# Patient Record
Sex: MALE | Race: WHITE | NOT HISPANIC OR LATINO | URBAN - METROPOLITAN AREA
[De-identification: names, ages, dates, MRNs, and addresses within clinical notes are randomized per-mention and may not be internally consistent; named-entity substitution may affect disease eponyms.]

---

## 2018-01-02 ENCOUNTER — OFFICE VISIT (OUTPATIENT)
Dept: ORTHOPEDICS | Facility: CLINIC | Age: 23
End: 2018-01-02
Payer: COMMERCIAL

## 2018-01-02 ENCOUNTER — RADIANT APPOINTMENT (OUTPATIENT)
Dept: GENERAL RADIOLOGY | Facility: CLINIC | Age: 23
End: 2018-01-02
Attending: FAMILY MEDICINE
Payer: COMMERCIAL

## 2018-01-02 VITALS
BODY MASS INDEX: 24.21 KG/M2 | HEIGHT: 77 IN | SYSTOLIC BLOOD PRESSURE: 128 MMHG | WEIGHT: 205 LBS | DIASTOLIC BLOOD PRESSURE: 78 MMHG

## 2018-01-02 DIAGNOSIS — M25.541 PAIN IN THUMB JOINT WITH MOVEMENT, RIGHT: Primary | ICD-10-CM

## 2018-01-02 DIAGNOSIS — M25.541 PAIN IN THUMB JOINT WITH MOVEMENT, RIGHT: ICD-10-CM

## 2018-01-02 DIAGNOSIS — M79.644 PAIN OF FINGER OF RIGHT HAND: ICD-10-CM

## 2018-01-02 NOTE — PROGRESS NOTES
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    Reason for visit:     What part of your body is injured / painful?  right thumb    What caused the injury /pain? Insidious onset. Had practicum and was sore that night.    How long ago did your injury occur or pain begin? Late October    What are your most bothersome symptoms? Pain and Swelling    How would you characterize your symptom?  dull    What makes your symptoms better? Ice and Other: OTC splint    What makes your symptoms worse? Other: holding pencil/utensil    Have you been previously seen for this problem? No    Medical History:    Any recent changes to your medical history? No    Any new medication prescribed since last visit? No    Have you had surgery on this body part before? No    Social History:    Occupation: Dental school student    Handedness: Right    Exercise: 3-4 days/week    Review of Systems:    Do you have fever, chills, weight loss? No    Do you have any vision problems? No    Do you have any chest pain or edema? No    Do you have any shortness of breath or wheezing?  No    Do you have stomach problems? No    Do you have any numbness or focal weakness? No    Do you have diabetes? No    Do you have problems with bleeding or clotting? No    Do you have an rashes or other skin lesions? No

## 2018-01-02 NOTE — MR AVS SNAPSHOT
After Visit Summary   1/2/2018    Wyatt Ni    MRN: [id number]           Patient Information     Date Of Birth          1995        Visit Information        Provider Department      1/2/2018 1:20 PM Crow Hale DO [facility]        Today's Diagnoses     Pain in thumb joint with movement, right    -  1    Pain of finger of right hand           Follow-ups after your visit        Additional Services     OCCUPATIONAL THERAPY REFERRAL       [facility] provides Occupational Therapy evaluation and treatment and many specialty services across the [facility].  If requesting a specialty program, please choose from the list below.    If you have not heard from the scheduling office within 2 business days, please call [phone] for all locations, with the exception of [place], please call [phone].     Treatment: Evaluation & Treatment    Please be aware that coverage of these services is subject to the terms and limitations of your health insurance plan.  Call member services at your health plan with any benefit or coverage questions.                  Your next 10 appointments already scheduled     Jan 12, 2018  7:00 AM CST   SONIA Hand with Farnaz Dos Santos OT   [facility] ([facility])    [address] [place] [address] [phone]              Who to contact     Please call your clinic at [phone] to:    Ask questions about your health    Make or cancel appointments    Discuss your medicines    Learn about your test results    Speak to your doctor   If you have compliments or concerns about an experience at your clinic, or if you wish to file a complaint, please contact [facility] Patient Relations at [phone] or email us at [email or web address]         Additional Information About Your Visit        MyChart Information  "    [email or web address] gives you secure access to your electronic health record. If you see a primary care provider, you can also send messages to your care team and make appointments. If you have questions, please call your primary care clinic.  If you do not have a primary care provider, please call [phone] and they will assist you.      [email or web address] is an electronic gateway that provides easy, online access to your medical records. With [email or web address], you can request a clinic appointment, read your test results, renew a prescription or communicate with your care team.     To access your existing account, please contact your [facility] or call [phone] for assistance.        Care EveryWhere ID     This is your Care EveryWhere ID. This could be used by other organizations to access your [facility] medical records  [id number]        Your Vitals Were     Height BMI (Body Mass Index)                1.956 m (6' 5\") 24.31 kg/m2           Blood Pressure from Last 3 Encounters:   01/02/18 128/78   05/13/16 132/70   05/08/15 126/70    Weight from Last 3 Encounters:   01/02/18 93 kg (205 lb)   05/13/16 98.5 kg (217 lb 1.6 oz)   05/08/15 92.5 kg (204 lb)              We Performed the Following     OCCUPATIONAL THERAPY REFERRAL        Primary Care Provider Office Phone # Fax #    Brenda BRYNN Mayes [phone]       [address] [facility] [address]        Equal Access to Services     [facility]: Hadii mateo hallo Soosmin, waaxda luqadaha, qaybta kaalmada jesseyavarun, heaven hernández. So [facility] [phone].    ATENCIÓN: Si habla español, tiene a jones disposición servicios gratuitos de asistencia lingüística. Llame al [phone].    We comply with applicable federal civil rights laws and [place] laws. We do not discriminate on the basis of race, color, national origin, age, disability, sex, sexual orientation, or gender identity.            Thank " you!     Thank you for choosing [facility]  for your care. Our goal is always to provide you with excellent care. Hearing back from our patients is one way we can continue to improve our services. Please take a few minutes to complete the written survey that you may receive in the mail after your visit with us. Thank you!             Your Updated Medication List - Protect others around you: Learn how to safely use, store and throw away your medicines at www.disposemymeds.org.          This list is accurate as of: 1/2/18 11:59 PM.  Always use your most recent med list.                   Brand Name Dispense Instructions for use Diagnosis    CLARITIN 10 MG capsule   Generic drug:  loratadine      Take 10 mg by mouth daily.        fluticasone 50 MCG/ACT spray    FLONASE    1 Package    2 sprays by Both Nostrils route daily.    Chronic rhinitis       ibuprofen 200 MG tablet    ADVIL/MOTRIN    100    TAKE 1 TO 2 TABLETS EVERY 4 TO 6 HOURS AS NEEDED WITH FOOD

## 2018-01-02 NOTE — LETTER
1/2/2018       RE: Wyatt Ni  [address] [facility] [address]     Dear Colleague,    Thank you for referring your patient, Wyatt Ni, to the [facility] SPORTS AND ORTHOPAEDIC WALK IN CLINIC at [facility]. Please see a copy of my visit note below.          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    Reason for visit:     What part of your body is injured / painful?  right thumb    What caused the injury /pain? Insidious onset. Had practicum and was sore that night.    How long ago did your injury occur or pain begin? Late October    What are your most bothersome symptoms? Pain and Swelling    How would you characterize your symptom?  dull    What makes your symptoms better? Ice and Other: OTC splint    What makes your symptoms worse? Other: holding pencil/utensil    Have you been previously seen for this problem? No    Medical History:    Any recent changes to your medical history? No    Any new medication prescribed since last visit? No    Have you had surgery on this body part before? No    Social History:    Occupation: Dental school student    Handedness: Right    Exercise: 3-4 days/week    Review of Systems:    Do you have fever, chills, weight loss? No    Do you have any vision problems? No    Do you have any chest pain or edema? No    Do you have any shortness of breath or wheezing?  No    Do you have stomach problems? No    Do you have any numbness or focal weakness? No    Do you have diabetes? No    Do you have problems with bleeding or clotting? No    Do you have an rashes or other skin lesions? No           CHIEF COMPLAINT:  New Patient (Right thumb pain)       HISTORY OF PRESENT ILLNESS  Mr. Ni is a pleasant 22 year old year old male who presents to clinic today with pain of his right dominant thumb since October.  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  Noticed pain at the MCP joint of his right thumb.  Additional small area of swelling over extensor tendon of thumb.  Also noted pain at second digit which seems to be resolved.  This pain typically occurs with gripping, utensil use and can be sporadic at times.  Pain is described as a aching.  Relieved with rest.  He has tried using a thumb splint at times, icing at times but notes pain is still present and noticed during his practicum (fine motor use of hands) last week.      Additional history: as documented    MEDICAL HISTORY  Patient Active Problem List   Diagnosis     Tooth avulsion     Minor head injury     Allergic rhinitis     Pain of right thumb       Current Outpatient Prescriptions   Medication Sig Dispense Refill     Loratadine (CLARITIN) 10 MG capsule Take 10 mg by mouth daily.       fluticasone (FLONASE) 50 MCG/ACT nasal spray 2 sprays by Both Nostrils route daily. 1 Package 12     IBUPROFEN 200 MG OR TABS TAKE 1 TO 2 TABLETS EVERY 4 TO 6 HOURS AS NEEDED WITH FOOD 100 0       Allergies   Allergen Reactions     No Known Drug Allergies        Family History   Problem Relation Age of Onset     CANCER Paternal Grandfather      prostate     HEART DISEASE Paternal Grandfather      bi-pass     Lipids Mother      Lipids Father      Lipids Maternal Grandmother      Lipids Maternal Grandfather      Lipids Paternal Grandfather      Social:   Dental student  Right handed    Additional medical/Social/Surgical histories reviewed in [facility] and updated as appropriate.     REVIEW OF SYSTEMS (1/5/2018)  CONSTITUTIONAL: Denies fever and weight loss  EYES: Denies acute vision changes  ENT: Denies hearing changes or difficulty swallowing  CARDIAC: Denies chest pain or edema  RESPIRATORY: Denies dyspnea, cough or wheeze  GASTROINTESTINAL: Denies abdominal pain, nausea, vomiting  MUSCULOSKELETAL: See HPI  SKIN: Denies any recent  "rash or lesion  NEUROLOGICAL: Denies numbness or focal weakness  PSYCHIATRIC: No history of psychiatric symptoms or problems  ENDOCRINE: Denies current diagnosis of diabetes  HEMATOLOGY: Denies episodes of easy bleeding      PHYSICAL EXAM  /78  Ht 1.956 m (6' 5\")  Wt 93 kg (205 lb)  BMI 24.31 kg/m2    General Appearance: Well appearing, alert, in no acute distress, well-hydrated, and well nourished  Cardiovascular: no signs of upper or lower extremity edema  Respiratory: no respiratory distress, no audible wheezing, no labored breathing, symmetric thoracic excursion  Psychiatric: mood and affect are appropriate, patient is oriented to time, place and person  Musculoskeletal: Right hand  Inspection: Normal inspection of hand with no swelling, ecchymosis or obvious deformity.  Skin is intact.  Palpation: Tenderness to palpation of right CMC joint dorsally. Additional tenderness over extensor surface of index finger. Nontender throughout the remainder of hand and fingers. No tenderness to palpation overlying anatomical snuffbox.    Range of motion: Good overall range of motion of thumb in all planes.  Fingers with normal flexion and extension.  FDP and FDS intact. EDC and EIP intact.   No rotational malalignment or scissoring of digits.  Strength: Full strength of flexion and extension of digits.   strength normal compared bilaterally.   Special Tests:   -Finkelstein Test: Negative  -Tinel's sign: Negative  -Median nerve compression test: Negative  -Fromet's sign: negative  -Hand intrinsics: Intact  Neurologic: Sensation intact throughout hand and fingers  Vascular: Capillary refill <2s.  Digits warm, well perfused.       IMAGING : XR RT hand. Final results and radiologist's interpretation, available in the [facility] health record. Images were reviewed with the patient/family members in the office today. My personal interpretation of the performed imaging is no acute osseous abnormality, CMC joint space " preserved.     ASSESSMENT & PLAN  Mr. Ni is a 22 year old year old male who presents to clinic today with intermittent pain of his thumb and second digit x 2 months.  Pain is more pronounced with use of his hands.  His symptoms may be related to overuse considering profession vs. Mild ulnar nerve entrapment.  It is quite possible that both conditions coexist given focal tenderness at CMC.  In any case, he will benefit from evaluation and treatment with occupational hand therapy    Diagnosis:   Pain of thumb  Pain of index finger    -Referral to OT  -Continue splinting  -Ibuprofen, ice  -Follow up 4 weeks / after OT; if persisting will consider EMG vs. MR    It was a pleasure seeing Wyatt today.    Crow Hale DO, [facility]  Primary Care Sports Medicine      Again, thank you for allowing me to participate in the care of your patient.      Sincerely,    Crow Hale DO

## 2018-01-04 ENCOUNTER — THERAPY VISIT (OUTPATIENT)
Dept: OCCUPATIONAL THERAPY | Facility: CLINIC | Age: 23
End: 2018-01-04
Payer: COMMERCIAL

## 2018-01-04 DIAGNOSIS — M79.644 PAIN OF RIGHT THUMB: Primary | ICD-10-CM

## 2018-01-04 PROCEDURE — 97165 OT EVAL LOW COMPLEX 30 MIN: CPT | Mod: GO | Performed by: OCCUPATIONAL THERAPIST

## 2018-01-04 PROCEDURE — 97140 MANUAL THERAPY 1/> REGIONS: CPT | Mod: GO | Performed by: OCCUPATIONAL THERAPIST

## 2018-01-04 PROCEDURE — 97110 THERAPEUTIC EXERCISES: CPT | Mod: GO | Performed by: OCCUPATIONAL THERAPIST

## 2018-01-04 NOTE — MR AVS SNAPSHOT
After Visit Summary   1/4/2018    Wyatt Ni    MRN: [id number]           Patient Information     Date Of Birth          1995        Visit Information        Provider Department      1/4/2018 11:30 AM Farnaz Dos Santos OT [facility]        Today's Diagnoses     Pain of right thumb    -  1       Follow-ups after your visit        Your next 10 appointments already scheduled     Jan 12, 2018  7:00 AM CST   [facility] Hand with JESSICA [facility] ([facility])    [address] [place] [address]  [address] [facility] [address] [phone]              Who to contact     If you have questions or need follow up information about today's clinic visit or your schedule please contact [facility] directly at [phone].  Normal or non-critical lab and imaging results will be communicated to you by [employer], letter or phone within 4 business days after the clinic has received the results. If you do not hear from us within 7 days, please contact the clinic through [employer] or phone. If you have a critical or abnormal lab result, we will notify you by phone as soon as possible.  Submit refill requests through [employer] or call your pharmacy and they will forward the refill request to us. Please allow 3 business days for your refill to be completed.          Additional Information About Your Visit        MyChart Information     [employer] gives you secure access to your electronic health record. If you see a primary care provider, you can also send messages to your care team and make appointments. If you have questions, please call your primary care clinic.  If you do not have a primary care provider, please call [phone] and they will assist you.        Care EveryWhere ID     This is your Care EveryWhere ID. This could be used by other organizations to access your [place] medical records  [id number]         Blood Pressure from Last 3  Encounters:   01/02/18 128/78   05/13/16 132/70   05/08/15 126/70    Weight from Last 3 Encounters:   01/02/18 93 kg (205 lb)   05/13/16 98.5 kg (217 lb 1.6 oz)   05/08/15 92.5 kg (204 lb)              We Performed the Following     HC OT EVAL, LOW COMPLEXITY     MANUAL THER TECH,1+REGIONS,EA 15 MIN     THERAPEUTIC EXERCISES        Primary Care Provider Office Phone # Fax Daisha SWAIN MD [place] [phone]       [address] [facility] [address]        Equal Access to Services     [facility]: Hadii aad ku hadasho Soosmin, waaxda luqadaha, qaybta kaalmada adeduglasyada, heaven carpenter . So [facility] [phone].    ATENCIÓN: Si habla español, tiene a jones disposición servicios gratuitos de asistencia lingüística. Llame al [phone].    We comply with applicable federal civil rights laws and [place] laws. We do not discriminate on the basis of race, color, national origin, age, disability, sex, sexual orientation, or gender identity.            Thank you!     Thank you for choosing [facility]ER[facility]  for your care. Our goal is always to provide you with excellent care. Hearing back from our patients is one way we can continue to improve our services. Please take a few minutes to complete the written survey that you may receive in the mail after your visit with us. Thank you!             Your Updated Medication List - Protect others around you: Learn how to safely use, store and throw away your medicines at www.disposemymeds.org.          This list is accurate as of: 1/4/18 12:17 PM.  Always use your most recent med list.                   Brand Name Dispense Instructions for use Diagnosis    CLARITIN 10 MG capsule   Generic drug:  loratadine      Take 10 mg by mouth daily.        fluticasone 50 MCG/ACT spray    FLONASE    1 Package    2 sprays by Both Nostrils route daily.    Chronic rhinitis       ibuprofen 200 MG tablet    ADVIL/MOTRIN    100    TAKE 1 TO  2 TABLETS EVERY 4 TO 6 HOURS AS NEEDED WITH FOOD

## 2018-01-04 NOTE — PROGRESS NOTES
Hand Therapy Initial Evaluation    Current Date:  1/4/2018    Diagnosis: R thumb and index finger pain  DOI:  November 2017   Procedure:  none    Precautions: NA    Subjective:  Wyatt Ni is a 22 year old right hand dominant male.    Patient reports symptoms of pain, stiffness/loss of motion, edema, numbness and tingling  of the right thumb, index finger and small finger which occurred due to not sure. Since onset symptoms are Unchanged  Special tests:  x-ray.  Previous treatment: OTC splint, ice, anti-inflammatories.    General health as reported by patient is excellent.  Pertinent medical history includes:numbness/tingling  Medical allergies:none.  Surgical history: none.  Medication history: anti-inflammatory.    Occupational Profile Information:  Current occupation is  Student-dentistry  Currently working in normal job without restrictions  Job Tasks: repetitive tasks  Prior functional level:  no limitations  Barriers include:none  Mobility: No difficulty  Transportation: drives  Leisure activities/hobbies: basketball    Functional Outcome Measure:   Upper Extremity Functional Index Score:  SCORE:   Column Totals: /80: 74   (A lower score indicates greater disability.)    Objective:  Pain Level Report  VAS(0-10) 1/4/2018   At Rest: 0/10   At worst: 4/10     Report of Pain:  Location:  CMC joint of thumb, radial side of index metacarpal  Pain Quality:  Sharp  Frequency: intermittent    Pain is worst:  daytime  Exacerbated by:  Repetitive activities  Relieved by:  otc medications and rest  Progression:  improving  Edema:  MILD  Sensation: Decreased Ulnar Nerve distribution    Numbness/tingling Level Report  VAS(0-10) 1/4/2018   At Rest: 2/10   At Worst: 7/10     Report of Numbness/Tingling:  Location:  small finger    ROM  Pain Report:  - none    + mild    ++ moderate    +++ severe   Thumb    1/4/2018   AROM  (PROM) left right   MP -11/72 -13/69   IP 0/76 0/62   RAB 51 47   PABD 56 44     Resistance 1/4/2018    APL -   FPL -   FPB -   EPL -   EPB -   opponens -     Provocative Testing 1/4/2018   Tinel's at [address] -   Tinel's at [address] [place]      Strength:   (Measured in pounds)  Pain Report:  - none    + mild    ++ moderate    +++ severe     1/4/2018   Trials left right   1  2  3 66  71  78 96  90  75   Average: 72 87     Lat Pinch  1/4/2018   Trials left right   1  2  3 18  20  19 19  21  21   Average: 19 20     3 Pt Pinch  1/4/2018   Trials left right   1  2  3 18  20  22 21  21  22   Average: 20 21     Assessment:  Patient presents with symptoms consistent with diagnosis of R thumb, index finger and small finger pain,  with conservative intervention.     Patient's limitations or Problem List includes:  Pain, Decreased ROM/motion, Weakness and Sensory disturbance of the left thumb, index finger and small finger which interferes with the patient's ability to perform Self Care Tasks (dressing, eating, bathing, hygiene/toileting) and Work Tasks as compared to previous level of function.    Rehab Potential:  Excellent - Return to full activity, no limitations    Patient will benefit from skilled Occupational Therapy to increase ROM, overall strength and stability of thumb and decrease pain to return to previous activity level and resume normal daily tasks and to reach their rehab potential.    Barriers to Learning:  No barrier    Communication Issues:  Patient appears to be able to clearly communicate and understand verbal and written communication and follow directions correctly.    Chart Review: Simple history review with patient    Identified Performance Deficits: dressing, hygiene and grooming, meal preparation and cleanup and work    Assessment of Occupational Performance:  3-5 Performance Deficits    Clinical Decision Making (Complexity): Low complexity    Treatment Explanation:  The following has been discussed with the patient:  RX ordered/plan of care  Anticipated outcomes  Possible risks and side  effects    Plan:  Frequency:  1 X week, once daily  Duration:  for 4 weeks    Treatment Plan:   Modalities:  US, Fluidotherapy and Paraffin  Therapeutic Exercise:  AROM, AAROM, PROM, Tendon Gliding, Blocking, Isotonics, Isometrics and Stabilization  Manual Techniques:  Myofascial release  Orthotic Fabrication:  prn  Self Care:  Self Care Tasks  Discharge Plan:  Achieve all LTG.  Independent in home treatment program.  Reach maximal therapeutic benefit.    Home Exercise Program:  Thumb stability   C   1st DI strengthening  Massage   Chip clip adductor   Golfball/marble to thenar eminence  Ice vs. Heat  Thumb AROM  Ulnar Nerve glide    Next Visit:  Nerve gliding  A/AA/PROM  Thumb stabilty

## 2018-01-05 NOTE — PROGRESS NOTES
CHIEF COMPLAINT:  New Patient (Right thumb pain)       HISTORY OF PRESENT ILLNESS  Mr. Ni is a pleasant 22 year old year old male who presents to clinic today with pain of his right dominant thumb since October.  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  Noticed pain at the MCP joint of his right thumb.  Additional small area of swelling over extensor tendon of thumb.  Also noted pain at second digit which seems to be resolved.  This pain typically occurs with gripping, utensil use and can be sporadic at times.  Pain is described as a aching.  Relieved with rest.  He has tried using a thumb splint at times, icing at times but notes pain is still present and noticed during his practicum (fine motor use of hands) last week.      Additional history: as documented    MEDICAL HISTORY  Patient Active Problem List   Diagnosis     Tooth avulsion     Minor head injury     Allergic rhinitis     Pain of right thumb       Current Outpatient Prescriptions   Medication Sig Dispense Refill     Loratadine (CLARITIN) 10 MG capsule Take 10 mg by mouth daily.       fluticasone (FLONASE) 50 MCG/ACT nasal spray 2 sprays by Both Nostrils route daily. 1 Package 12     IBUPROFEN 200 MG OR TABS TAKE 1 TO 2 TABLETS EVERY 4 TO 6 HOURS AS NEEDED WITH FOOD 100 0       Allergies   Allergen Reactions     No Known Drug Allergies        Family History   Problem Relation Age of Onset     CANCER Paternal Grandfather      prostate     HEART DISEASE Paternal Grandfather      bi-pass     Lipids Mother      Lipids Father      Lipids Maternal Grandmother      Lipids Maternal Grandfather      Lipids Paternal Grandfather      Social:   Dental student  Right handed    Additional medical/Social/Surgical histories reviewed in [facility] and updated as appropriate.     REVIEW OF SYSTEMS (1/5/2018)  CONSTITUTIONAL: Denies fever and weight loss  EYES: Denies acute vision  "changes  ENT: Denies hearing changes or difficulty swallowing  CARDIAC: Denies chest pain or edema  RESPIRATORY: Denies dyspnea, cough or wheeze  GASTROINTESTINAL: Denies abdominal pain, nausea, vomiting  MUSCULOSKELETAL: See HPI  SKIN: Denies any recent rash or lesion  NEUROLOGICAL: Denies numbness or focal weakness  PSYCHIATRIC: No history of psychiatric symptoms or problems  ENDOCRINE: Denies current diagnosis of diabetes  HEMATOLOGY: Denies episodes of easy bleeding      PHYSICAL EXAM  /78  Ht 1.956 m (6' 5\")  Wt 93 kg (205 lb)  BMI 24.31 kg/m2    General Appearance: Well appearing, alert, in no acute distress, well-hydrated, and well nourished  Cardiovascular: no signs of upper or lower extremity edema  Respiratory: no respiratory distress, no audible wheezing, no labored breathing, symmetric thoracic excursion  Psychiatric: mood and affect are appropriate, patient is oriented to time, place and person  Musculoskeletal: Right hand  Inspection: Normal inspection of hand with no swelling, ecchymosis or obvious deformity.  Skin is intact.  Palpation: Tenderness to palpation of right CMC joint dorsally. Additional tenderness over extensor surface of index finger. Nontender throughout the remainder of hand and fingers. No tenderness to palpation overlying anatomical snuffbox.    Range of motion: Good overall range of motion of thumb in all planes.  Fingers with normal flexion and extension.  FDP and FDS intact. EDC and EIP intact.   No rotational malalignment or scissoring of digits.  Strength: Full strength of flexion and extension of digits.   strength normal compared bilaterally.   Special Tests:   -Finkelstein Test: Negative  -Tinel's sign: Negative  -Median nerve compression test: Negative  -Fromet's sign: negative  -Hand intrinsics: Intact  Neurologic: Sensation intact throughout hand and fingers  Vascular: Capillary refill <2s.  Digits warm, well perfused.       IMAGING : XR RT hand. Final " results and radiologist's interpretation, available in the [facility] health record. Images were reviewed with the patient/family members in the office today. My personal interpretation of the performed imaging is no acute osseous abnormality, CMC joint space preserved.     ASSESSMENT & PLAN  Mr. Ni is a 22 year old year old male who presents to clinic today with intermittent pain of his thumb and second digit x 2 months.  Pain is more pronounced with use of his hands.  His symptoms may be related to overuse considering profession vs. Mild ulnar nerve entrapment.  It is quite possible that both conditions coexist given focal tenderness at CMC.  In any case, he will benefit from evaluation and treatment with occupational hand therapy    Diagnosis:   Pain of thumb  Pain of index finger    -Referral to OT  -Continue splinting  -Ibuprofen, ice  -Follow up 4 weeks / after OT; if persisting will consider EMG vs. MR    It was a pleasure seeing Wyatt today.    Crow Hale DO, CAM  Primary Care Sports Medicine

## 2018-01-12 ENCOUNTER — THERAPY VISIT (OUTPATIENT)
Dept: OCCUPATIONAL THERAPY | Facility: CLINIC | Age: 23
End: 2018-01-12
Payer: COMMERCIAL

## 2018-01-12 DIAGNOSIS — M79.644 PAIN OF RIGHT THUMB: ICD-10-CM

## 2018-01-12 PROCEDURE — 97110 THERAPEUTIC EXERCISES: CPT | Mod: GO | Performed by: OCCUPATIONAL THERAPIST

## 2018-01-12 PROCEDURE — 97140 MANUAL THERAPY 1/> REGIONS: CPT | Mod: GO | Performed by: OCCUPATIONAL THERAPIST

## 2018-01-30 ENCOUNTER — THERAPY VISIT (OUTPATIENT)
Dept: OCCUPATIONAL THERAPY | Facility: CLINIC | Age: 23
End: 2018-01-30
Payer: COMMERCIAL

## 2018-01-30 DIAGNOSIS — M79.644 PAIN OF RIGHT THUMB: ICD-10-CM

## 2018-01-30 PROCEDURE — 97140 MANUAL THERAPY 1/> REGIONS: CPT | Mod: GO | Performed by: OCCUPATIONAL THERAPIST

## 2018-01-30 PROCEDURE — 97110 THERAPEUTIC EXERCISES: CPT | Mod: GO | Performed by: OCCUPATIONAL THERAPIST

## 2018-02-20 ENCOUNTER — THERAPY VISIT (OUTPATIENT)
Dept: OCCUPATIONAL THERAPY | Facility: CLINIC | Age: 23
End: 2018-02-20
Payer: COMMERCIAL

## 2018-02-20 DIAGNOSIS — M79.644 PAIN OF RIGHT THUMB: ICD-10-CM

## 2018-02-20 PROCEDURE — 97110 THERAPEUTIC EXERCISES: CPT | Mod: GO | Performed by: OCCUPATIONAL THERAPIST

## 2018-02-20 PROCEDURE — 97140 MANUAL THERAPY 1/> REGIONS: CPT | Mod: GO | Performed by: OCCUPATIONAL THERAPIST

## 2018-02-20 NOTE — PROGRESS NOTES
"Hand Therapy Discharge Note    Current Date:  2/20/2018    Diagnosis: R thumb and index finger pain  DOI:  November 2017   Procedure:  none    Precautions: NA    Reporting period is 1/4/18 to 2/20/2018    Subjective:   Subjective changes noted by patient:  \"I feel like my hand is better!\"  Functional changes noted by patient:  Improvement in Self Care Tasks (dressing, eating, bathing, hygiene/toileting), Work Tasks, Sleep Patterns, Recreational Activities, Household Chores and Driving   Patient has noted adverse reaction to:  None    Functional Outcome Measure:  Upper Extremity Functional Index Score:  SCORE:   Column Totals: /80: 77   (A lower score indicates greater disability.)    Objective:  Pain Level Report  VAS(0-10) 1/4/2018 2/20/18   At Rest: 0/10 0/10   At worst: 4/10 3/10     Report of Pain:  Location:  CMC joint of thumb, radial side of index metacarpal  Pain Quality:  dull  Frequency: intermittent    Pain is worst:  daytime  Exacerbated by:  Repetitive activities  Relieved by:  rest  Progression:  improving  Edema:  MILD  Sensation: Decreased Ulnar Nerve distribution    Numbness/tingling Level Report  VAS(0-10) 1/4/2018 2/20/18   At Rest: 2/10 0/10   At Worst: 7/10 0/10     Report of Numbness/Tingling:  Location:  small finger    ROM  Pain Report:  - none    + mild    ++ moderate    +++ severe   Thumb    1/4/2018 2/20/18   AROM  (PROM) left right    MP -11/72 -13/69 -13/77   IP 0/76 0/62 0/64   RAB 51 47 58   PABD 56 44 60     Resistance 1/4/2018   APL -   FPL -   FPB -   EPL -   EPB -   opponens -     Provocative Testing 1/4/2018   Tinel's at [place] -   Tinel's at [place]      Strength:   (Measured in pounds)  Pain Report:  - none    + mild    ++ moderate    +++ severe     1/4/2018 2/20/18   Trials left right    1  2  3 66  71  78 96  90  75 98  99  104   Average: 72 87 100     Lat Pinch  1/4/2018 2/20/18   Trials left right    1  2  3 18  20  19 19  21  21 16  18  17   Average: 19 20 " 17     3 Pt Pinch  1/4/2018 2/20/18   Trials left right    1  2  3 18  20  22 21  21  22 10  12  10   Average: 20 21 11     Please refer to the daily flowsheet for treatment provided today.     Assessment:  Response to therapy has been improvement to:  ROM of Thumb:  All Planes  Fingers: All Planes  Strength:    Edema:  less brawny edema  Pain:  frequency is less    Overall Assessment:  Patient's symptoms are resolving.  STG/LTG:  STGoals have been reviewed and progress or achievement has occurred;  see goal sheet for details and updates.    Plan:  Frequency/Duration:  DC to I HEP.  Appropriateness of Rx I have re-evaluated this patient and find that the nature, scope, duration and intensity of the therapy is appropriate for the medical condition of the patient.  Recommendations for Continued Therapy  DC to I HEP.    Home Exercise Program:  Thumb stability   C   1st DI strengthening  Massage   Chip clip adductor   Golfball/marble to thenar eminence  Ice vs. Heat  Thumb AROM  Ulnar Nerve glide

## 2018-02-20 NOTE — MR AVS SNAPSHOT
After Visit Summary   2/20/2018    Wyatt Ni    MRN: [id number]           Patient Information     Date Of Birth          1995        Visit Information        Provider Department      2/20/2018 5:30 PM Farnaz Dos Santos, OT [facility]        Today's Diagnoses     Pain of right thumb           Follow-ups after your visit        Who to contact     If you have questions or need follow up information about today's clinic visit or your schedule please contact [facility] directly at [phone].  Normal or non-critical lab and imaging results will be communicated to you by MyChart, letter or phone within 4 business days after the clinic has received the results. If you do not hear from us within 7 days, please contact the clinic through COPsynchart or phone. If you have a critical or abnormal lab result, we will notify you by phone as soon as possible.  Submit refill requests through RollSale or call your pharmacy and they will forward the refill request to us. Please allow 3 business days for your refill to be completed.          Additional Information About Your Visit        MyChart Information     RollSale gives you secure access to your electronic health record. If you see a primary care provider, you can also send messages to your care team and make appointments. If you have questions, please call your primary care clinic.  If you do not have a primary care provider, please call [phone] and they will assist you.        Care EveryWhere ID     This is your Care EveryWhere ID. This could be used by other organizations to access your [place] medical records  [id number]         Blood Pressure from Last 3 Encounters:   01/02/18 128/78   05/13/16 132/70   05/08/15 126/70    Weight from Last 3 Encounters:   01/02/18 93 kg (205 lb)   05/13/16 98.5 kg (217 lb 1.6 oz)   05/08/15 92.5 kg (204 lb)              We Performed the Following     MANUAL THER TECH,1+REGIONS,EA 15 MIN      THERAPEUTIC EXERCISES        Primary Care Provider Office Phone # Fax #    Krishnakumari BRYNN MD Sugey [phone]       [address]  [facility] [address]        Equal Access to Services     KATHY DAVIS : Komal mateo wilson rafaelo Soyassineali, waaxda luqadaha, qaybta kaalmada adeegyada, heaven velasco laNimaelisha hernández. So [facility] [phone].    ATENCIÓN: Si habla español, tiene a jones disposición servicios gratuitos de asistencia lingüística. Llame al [phone].    We comply with applicable federal civil rights laws and [place] laws. We do not discriminate on the basis of race, color, national origin, age, disability, sex, sexual orientation, or gender identity.            Thank you!     Thank you for choosing [facility] HAND [facility]ER[facility]  for your care. Our goal is always to provide you with excellent care. Hearing back from our patients is one way we can continue to improve our services. Please take a few minutes to complete the written survey that you may receive in the mail after your visit with us. Thank you!             Your Updated Medication List - Protect others around you: Learn how to safely use, store and throw away your medicines at www.disposemymeds.org.          This list is accurate as of 2/20/18  6:58 PM.  Always use your most recent med list.                   Brand Name Dispense Instructions for use Diagnosis    CLARITIN 10 MG capsule   Generic drug:  loratadine      Take 10 mg by mouth daily.        fluticasone 50 MCG/ACT spray    FLONASE    1 Package    2 sprays by Both Nostrils route daily.    Chronic rhinitis       ibuprofen 200 MG tablet    ADVIL/MOTRIN    100    TAKE 1 TO 2 TABLETS EVERY 4 TO 6 HOURS AS NEEDED WITH FOOD

## 2018-02-26 PROBLEM — M79.644 PAIN OF RIGHT THUMB: Status: RESOLVED | Noted: 2018-01-04 | Resolved: 2018-02-26

## 2018-10-02 ENCOUNTER — THERAPY VISIT (OUTPATIENT)
Dept: OCCUPATIONAL THERAPY | Facility: CLINIC | Age: 23
End: 2018-10-02
Payer: COMMERCIAL

## 2018-10-02 DIAGNOSIS — M79.644 PAIN OF RIGHT THUMB: Primary | ICD-10-CM

## 2018-10-02 PROCEDURE — 97110 THERAPEUTIC EXERCISES: CPT | Mod: GO | Performed by: OCCUPATIONAL THERAPIST

## 2018-10-02 PROCEDURE — 97140 MANUAL THERAPY 1/> REGIONS: CPT | Mod: GO | Performed by: OCCUPATIONAL THERAPIST

## 2018-10-02 PROCEDURE — 97760 ORTHOTIC MGMT&TRAING 1ST ENC: CPT | Mod: GO | Performed by: OCCUPATIONAL THERAPIST

## 2018-10-02 NOTE — PROGRESS NOTES
"Hand Therapy Progress Note    Current Date:  10/2/2018    Diagnosis: R thumb and index finger pain  DOI:  November 2017   Procedure:  none    Precautions: NA    Reporting period is 2/20/18 to 10/2/2018    Subjective:   Subjective changes noted by patient:  \"The rest of the spring semester went well, but then in the summer, we started seeing patients and it got worse then.  It got more painful around my thumb.  At the end of August we had a 3 week break and I rested it and did my exercises.  I had a board test then and it was 8-3, it got pretty sore after that.\"  Functional changes noted by patient:  No Change to Self Care Tasks (dressing, eating, bathing, hygiene/toileting), Work Tasks, Sleep Patterns, Recreational Activities, Household Chores and Driving   Patient has noted adverse reaction to:  None    Functional Outcome Measure:  Upper Extremity Functional Index Score:  SCORE:   Column Totals: /80: 70   (A lower score indicates greater disability.)    Objective:  Pain Level Report  VAS(0-10) 1/4/2018 2/20/18 10/2/18   At Rest: 0/10 0/10 0/10   At worst: 4/10 3/10 7/10     Report of Pain:  Location:  CMC joint of thumb, 1st DC  Pain Quality:  Dull, tingling, sharp  Frequency: intermittent    Pain is worst:  daytime  Exacerbated by:  Repetitive activities  Relieved by:  rest  Progression:  Worse over last few months  Edema:  MILD  Sensation:   Report of Numbness/Tingling:  Location:   small finger- 1x/since last visit    Sometimes in snuff box/1st DC    ROM  Pain Report:  - none    + mild    ++ moderate    +++ severe   Thumb    1/4/2018 2/20/18 10/2/1/   AROM  (PROM) left right     MP -11/72 -13/69 -13/77 0/74   IP 0/76 0/62 0/64 0/72   RAB 51 47 58 60   PABD 56 44 60 61     Resistance 1/4/2018 10/2/18   APL -    FPL -    FPB -    EPL -    EPB -    opponens - 2/10     Strength:   (Measured in pounds)  Pain Report:  - none    + mild    ++ moderate    +++ severe     1/4/2018 2/20/18 10/2/18   Trials left right   "   1  2  3 66  71  78 96  90  75 98  99  104 91+  91+  83+   Average: 72 87 100 88     Lat Pinch  1/4/2018 2/20/18 10/2/18   Trials left right     1  2  3 18  20  19 19  21  21 16  18  17 19  21  22   Average: 19 20 17 21     3 Pt Pinch  1/4/2018 2/20/18 10/2/18   Trials left right     1  2  3 18  20  22 21  21  22 10  12  10 14  10  12   Average: 20 21 11 12     Please refer to the daily flowsheet for treatment provided today.     Assessment:  Response to therapy has been improvement to:  ROM of Thumb:  All Planes  Strength:   and pinch    Response to therapy has been lack of progress in:  Pain:  intensity of pain has increased    Overall Assessment:  Patient would benefit from continued therapy to achieve rehab potential  STG/LTG:  STGoals have been reviewed and no progress has been made;  see goal sheet for details and changes.    Plan:  Frequency/Duration:  Recommend continuing to see patient  2 X a month, once daily  for 2 months  Appropriateness of Rx I have re-evaluated this patient and find that the nature, scope, duration and intensity of the therapy is appropriate for the medical condition of the patient.  Recommendations for Continued Therapy  Additions to Treatment Plan -  Orthotic Fabrication:  Hand based thumb spica orthosis    Home Exercise Program:  Hand based thumb spica orthosis at night for sleeping  Thumb stability   C   1st DI strengthening  Massage   Chip clip adductor   Golf ball/marble to thenar eminence  Ice vs. Heat  Thumb AROM  Ulnar Nerve glide-PRN    Next Visit:  Orthosis modifications  Thumb stability  strengthening

## 2018-10-02 NOTE — MR AVS SNAPSHOT
After Visit Summary   10/2/2018    Wyatt Ni    MRN: [id number]           Patient Information     Date Of Birth          1995        Visit Information        Provider Department      10/2/2018 4:00 PM Farnaz Dos Santos OT [facility]        Today's Diagnoses     Pain of right thumb    -  1       Follow-ups after your visit        Your next 10 appointments already scheduled     Oct 18, 2018  4:30 PM CDT   [facility] ([facility] [place])    [address] [place] [address]  4th [facility] [address] [phone]              Who to contact     If you have questions or need follow up information about today's clinic visit or your schedule please contact [facility] directly at [phone].  Normal or non-critical lab and imaging results will be communicated to you by Tempolibhart, letter or phone within 4 business days after the clinic has received the results. If you do not hear from us within 7 days, please contact the clinic through Tempolibhart or phone. If you have a critical or abnormal lab result, we will notify you by phone as soon as possible.  Submit refill requests through Zero2IPO or call your pharmacy and they will forward the refill request to us. Please allow 3 business days for your refill to be completed.          Additional Information About Your Visit        MyChart Information     Zero2IPO gives you secure access to your electronic health record. If you see a primary care provider, you can also send messages to your care team and make appointments. If you have questions, please call your primary care clinic.  If you do not have a primary care provider, please call [phone] and they will assist you.        Care EveryWhere ID     This is your Care EveryWhere ID. This could be used by other organizations to access your [place] medical records  [id number]         Blood Pressure from Last 3  Encounters:   01/02/18 128/78   05/13/16 132/70   05/08/15 126/70    Weight from Last 3 Encounters:   01/02/18 93 kg (205 lb)   05/13/16 98.5 kg (217 lb 1.6 oz)   05/08/15 92.5 kg (204 lb)              We Performed the Following     MANUAL THER TECH,1+REGIONS,EA 15 MIN     ORTHOTIC MGMT AND TRAINING, EACH 15 MIN     THERAPEUTIC EXERCISES        Primary Care Provider Office Phone # Fax #    Brenda SWAIN MD S[facility] [phone]       [address] [facility] [address]        Equal Access to Services     [facility]: Hadii aad steve hadasho Soosmin, waaxda luqadaha, qaybta kaalmada brendon, heaven carpenter . So [facility] [phone].    ATENCIÓN: Si habla español, tiene a jones disposición servicios gratuitos de asistencia lingüística. [facility] [phone].    We comply with applicable federal civil rights laws and [place] laws. We do not discriminate on the basis of race, color, national origin, age, disability, sex, sexual orientation, or gender identity.            Thank you!     Thank you for choosing [facility]ER[facility]  for your care. Our goal is always to provide you with excellent care. Hearing back from our patients is one way we can continue to improve our services. Please take a few minutes to complete the written survey that you may receive in the mail after your visit with us. Thank you!             Your Updated Medication List - Protect others around you: Learn how to safely use, store and throw away your medicines at www.disposemymeds.org.          This list is accurate as of 10/2/18 11:59 PM.  Always use your most recent med list.                   Brand Name Dispense Instructions for use Diagnosis    CLARITIN 10 MG capsule   Generic drug:  loratadine      Take 10 mg by mouth daily.        fluticasone 50 MCG/ACT spray    FLONASE    1 Package    2 sprays by Both Nostrils route daily.    Chronic rhinitis       ibuprofen 200 MG tablet    ADVIL/MOTRIN    100     TAKE 1 TO 2 TABLETS EVERY 4 TO 6 HOURS AS NEEDED WITH FOOD

## 2018-10-03 PROBLEM — M79.644 PAIN OF RIGHT THUMB: Status: ACTIVE | Noted: 2018-10-03

## 2019-01-14 ENCOUNTER — TRANSFERRED RECORDS (OUTPATIENT)
Dept: HEALTH INFORMATION MANAGEMENT | Facility: CLINIC | Age: 24
End: 2019-01-14

## 2019-01-14 ENCOUNTER — MEDICAL CORRESPONDENCE (OUTPATIENT)
Dept: HEALTH INFORMATION MANAGEMENT | Facility: CLINIC | Age: 24
End: 2019-01-14

## 2019-02-01 ENCOUNTER — OFFICE VISIT (OUTPATIENT)
Dept: UROLOGY | Facility: CLINIC | Age: 24
End: 2019-02-01
Payer: COMMERCIAL

## 2019-02-01 ENCOUNTER — APPOINTMENT (OUTPATIENT)
Dept: LAB | Facility: CLINIC | Age: 24
End: 2019-02-01
Payer: COMMERCIAL

## 2019-02-01 VITALS
SYSTOLIC BLOOD PRESSURE: 124 MMHG | HEIGHT: 77 IN | DIASTOLIC BLOOD PRESSURE: 72 MMHG | BODY MASS INDEX: 23.95 KG/M2 | HEART RATE: 62 BPM | WEIGHT: 202.8 LBS

## 2019-02-01 DIAGNOSIS — I86.1 LEFT VARICOCELE: Primary | ICD-10-CM

## 2019-02-01 LAB — FSH SERPL-ACNC: 9.5 IU/L (ref 0.7–10.8)

## 2019-02-01 ASSESSMENT — PAIN SCALES - GENERAL: PAINLEVEL: NO PAIN (0)

## 2019-02-01 ASSESSMENT — MIFFLIN-ST. JEOR: SCORE: 2027.27

## 2019-02-01 NOTE — LETTER
"  RE: Wyatt Ni  [address]  [facility] [address]     Dear Colleague,    Thank you for referring your patient, Wyatt Ni, to the [facility] AND [facility]ST FOR PROSTATE AND UROLOGIC CANCERS at [facility]. Please see a copy of my visit note below.    I am seeing Wyatt Ni in consultation for Darvin for evaluation of asymptomatic left varicocele.    HPI:  Wyatt Ni is a 23 year old male with asymptomatic left varicocele.  This was diagnosed several years ago by scrotal ultrasound done 5/8/15, done for a left sided scrotal \"lump\".  On that US the right testis was 21cc, the left was measured at 13cc.  He is not currently trying for fertility.  No voiding issues.  Is not having pain from the varicocele.  Does not report testis asymmetry.     PAST MEDICAL HX:  Past Medical History:   Diagnosis Date     Allergic rhinitis      PAST SURG HX:  Past Surgical History:   Procedure Laterality Date     TONSILLECTOMY       FAMILY HX:  Family History   Problem Relation Age of Onset     Cancer Paternal Grandfather         prostate     Heart Disease Paternal Grandfather         bi-pass     Lipids Mother      Lipids Father      Lipids Maternal Grandmother      Lipids Maternal Grandfather      Lipids Paternal Grandfather        SOCIAL HX:  Social History     Tobacco Use     Smoking status: Never Smoker     Smokeless tobacco: Never Used   Substance Use Topics     Alcohol use: No     Alcohol/week: 0.0 oz     Comment: occ     Drug use: No       MEDICATIONS:  Current Outpatient Medications   Medication Sig     fluticasone (FLONASE) 50 MCG/ACT nasal spray 2 sprays by Both Nostrils route daily. (Patient not taking: Reported on 2/1/2019)     IBUPROFEN 200 MG OR TABS TAKE 1 TO 2 TABLETS EVERY 4 TO 6 HOURS AS NEEDED WITH FOOD     Loratadine (CLARITIN) 10 MG capsule Take 10 mg by mouth daily.     No current facility-administered medications for this visit.  " "      ALLERGIES:  No known drug allergies    GENERAL PHYSICAL EXAM:     /72   Pulse 62   Ht 1.956 m (6' 5\")   Wt 92 kg (202 lb 12.8 oz)   BMI 24.05 kg/m      Constitutional: No acute distress. Well nourished.   PSYCH: normal mood and affect.  NEURO: normal gait, no focal deficits.   EYES: anicteric, EOMI, PERR.  CARDIOPULMONARY: breathing non-labored, pulse regular rate/rhythm, no peripheral edema.  GI: Abdomen soft, non-tender, no surgical scars, no organomegaly.  MUSCULOSKELETAL: normal limb proportions, no muscle wasting, no contractures.  SKIN: Normal virilized hair distribution, no lesions, warts or rashes over genitalia, abdomen extremities or face.  HEME/LYMPH: no ecchymosis, no lymphadenopathy in groi, no lymphedema.     EXAM:  Phallus  circumcised, meatus adequate, no plaques palpated.   Left testis descended , size is normal  , consistency is normal . No intra-testicular masses.   Right testis descended , size is normal  , consistency is normal . No intra-testicular masses.   Epididymes present, non-tender, not-enlarged.   Left cord: Vas present. Grade II  varicocele noted.  Right cord: Vas present. No varicocele noted.     Prostate exam: deferred     Imaging/labs:  Lab Results   Component Value Date    CR 1.08 05/16/2016     No results found for: PSA      ASSESSMENT:     Left varicocele.  Asymptomatic and not trying for fertility at present.  Would be interested in fertility later in life.    PLAN:  Discussed risks, benefits, and alternatives of varix repair, including various methods.    I counseled him extensively on the nature of varicoceles, and their possible effects on testosterone production and fertility.  I described surgery and embolization approaches, and the detailed risks of surgical repair.  These include damage to artery (ischemia), damage to lymphatics ( hydrocele), as well as risk of recurrence (</= 1%). Discussed that about 2/3rds of men see improved semen parameters and that " improvement takes at least 3 months to see. Discussed that most men have decreased pain after varicocele repair but in rare cases varicocele repair can cause testis sensitivity.    Will get baseline fertility testing with labs and semen analysis, and contact him with results.      Discussed that the relative indication to repair his varicocele at this time would be fertility preservation.    He will consider options and I'll contact him with lab results.      Akhil Garces MD     Urological Surgeon

## 2019-02-01 NOTE — PROGRESS NOTES
"I am seeing Wyatt Ni in consultation for Darvin for evaluation of asymptomatic left varicocele.    HPI:  Wyatt Ni is a 23 year old male with asymptomatic left varicocele.  This was diagnosed several years ago by scrotal ultrasound done 5/8/15, done for a left sided scrotal \"lump\".  On that US the right testis was 21cc, the left was measured at 13cc.  He is not currently trying for fertility.  No voiding issues.  Is not having pain from the varicocele.  Does not report testis asymmetry.     REVIEW OF SYSTEMS:  General: negative  Skin: negative  Eyes: negative  Ears/Nose/Throat: negative  Respiratory: negative  Cardiovascular: negative  Gastrointestinal: negative  Genitourinary: see HPI  Musculoskeletal: negative  Neurologic: negative  Psychiatric: negative  Hematologic/Lymphatic/Immunologic: negative  Endocrine: negative    PAST MEDICAL HX:  Past Medical History:   Diagnosis Date     Allergic rhinitis        PAST SURG HX:  Past Surgical History:   Procedure Laterality Date     TONSILLECTOMY          FAMILY HX:  Family History   Problem Relation Age of Onset     Cancer Paternal Grandfather         prostate     Heart Disease Paternal Grandfather         bi-pass     Lipids Mother      Lipids Father      Lipids Maternal Grandmother      Lipids Maternal Grandfather      Lipids Paternal Grandfather        SOCIAL HX:  Social History     Tobacco Use     Smoking status: Never Smoker     Smokeless tobacco: Never Used   Substance Use Topics     Alcohol use: No     Alcohol/week: 0.0 oz     Comment: occ     Drug use: No       MEDICATIONS:  Current Outpatient Medications   Medication Sig     fluticasone (FLONASE) 50 MCG/ACT nasal spray 2 sprays by Both Nostrils route daily. (Patient not taking: Reported on 2/1/2019)     IBUPROFEN 200 MG OR TABS TAKE 1 TO 2 TABLETS EVERY 4 TO 6 HOURS AS NEEDED WITH FOOD     Loratadine (CLARITIN) 10 MG capsule Take 10 mg by mouth daily.     No current facility-administered medications " "for this visit.        ALLERGIES:  No known drug allergies      GENERAL PHYSICAL EXAM:     /72   Pulse 62   Ht 1.956 m (6' 5\")   Wt 92 kg (202 lb 12.8 oz)   BMI 24.05 kg/m     Constitutional: No acute distress. Well nourished.   PSYCH: normal mood and affect.  NEURO: normal gait, no focal deficits.   EYES: anicteric, EOMI, PERR.  CARDIOPULMONARY: breathing non-labored, pulse regular rate/rhythm, no peripheral edema.  GI: Abdomen soft, non-tender, no surgical scars, no organomegaly.  MUSCULOSKELETAL: normal limb proportions, no muscle wasting, no contractures.  SKIN: Normal virilized hair distribution, no lesions, warts or rashes over genitalia, abdomen extremities or face.  HEME/LYMPH: no ecchymosis, no lymphadenopathy in groi, no lymphedema.     EXAM:  Phallus  circumcised, meatus adequate, no plaques palpated.   Left testis descended , size is normal  , consistency is normal . No intra-testicular masses.   Right testis descended , size is normal  , consistency is normal . No intra-testicular masses.   Epididymes present, non-tender, not-enlarged.   Left cord: Vas present. Grade II  varicocele noted.  Right cord: Vas present. No varicocele noted.     Prostate exam: deferred     Imaging/labs:  Lab Results   Component Value Date    CR 1.08 05/16/2016     No results found for: PSA      ASSESSMENT:     Left varicocele.  Asymptomatic and not trying for fertility at present.  Would be interested in fertility later in life.    PLAN:  Discussed risks, benefits, and alternatives of varix repair, including various methods.    I counseled him extensively on the nature of varicoceles, and their possible effects on testosterone production and fertility.  I described surgery and embolization approaches, and the detailed risks of surgical repair.  These include damage to artery (ischemia), damage to lymphatics ( hydrocele), as well as risk of recurrence (</= 1%). Discussed that about 2/3rds of men see improved semen " parameters and that improvement takes at least 3 months to see. Discussed that most men have decreased pain after varicocele repair but in rare cases varicocele repair can cause testis sensitivity.    Will get baseline fertility testing with labs and semen analysis, and contact him with results.      Discussed that the relative indication to repair his varicocele at this time would be fertility preservation.    He will consider options and I'll contact him with lab results.      Akhil Garces MD     Urological Surgeon

## 2019-02-01 NOTE — NURSING NOTE
"Chief Complaint   Patient presents with     Consult     varicocele       Blood pressure 124/72, pulse 62, height 1.956 m (6' 5\"), weight 92 kg (202 lb 12.8 oz). Body mass index is 24.05 kg/m .    Patient Active Problem List   Diagnosis     Tooth avulsion     Minor head injury     Allergic rhinitis     Pain of right thumb       Allergies   Allergen Reactions     No Known Drug Allergies        Current Outpatient Medications   Medication Sig Dispense Refill     fluticasone (FLONASE) 50 MCG/ACT nasal spray 2 sprays by Both Nostrils route daily. (Patient not taking: Reported on 2/1/2019) 1 Package 12     IBUPROFEN 200 MG OR TABS TAKE 1 TO 2 TABLETS EVERY 4 TO 6 HOURS AS NEEDED WITH FOOD 100 0     Loratadine (CLARITIN) 10 MG capsule Take 10 mg by mouth daily.         Social History     Tobacco Use     Smoking status: Never Smoker     Smokeless tobacco: Never Used   Substance Use Topics     Alcohol use: No     Alcohol/week: 0.0 oz     Comment: occ     Drug use: No       Yvonne Pat LPN  2/1/2019  7:20 AM  "

## 2019-02-05 PROBLEM — M79.644 PAIN OF RIGHT THUMB: Status: RESOLVED | Noted: 2018-10-03 | Resolved: 2019-02-05

## 2019-02-05 LAB
ESTRADIOL SERPL HS-MCNC: 15 PG/ML (ref 10–40)
SHBG SERPL-SCNC: 52 NMOL/L (ref 11–80)
TESTOST FREE SERPL-MCNC: 11.61 NG/DL (ref 4.7–24.4)
TESTOST SERPL-MCNC: 703 NG/DL (ref 240–950)

## 2019-02-11 NOTE — RESULT ENCOUNTER NOTE
Dear Wyatt,     Here are your recent results.     Testosterone levels are normal.  Testosterone to estrogen ratio is normal.    FSH is the signal from the brain to the testicles to drive sperm production.  Your FSH level is clinically elevated; I prefer to see this under 7 or so.  Higher FSH typically indicates decreased sperm production ability in the testes, and the brain raises the FSH to try to drive more sperm production.      I would recommend having you complete the semen analysis.  You can call [facility] [phone] to schedule at your convenience, and I'll contact you with results.    Please let us know if you have any questions or concerns.     Dany JONES

## 2019-11-04 ENCOUNTER — HEALTH MAINTENANCE LETTER (OUTPATIENT)
Age: 24
End: 2019-11-04

## 2020-11-16 ENCOUNTER — HEALTH MAINTENANCE LETTER (OUTPATIENT)
Age: 25
End: 2020-11-16

## 2021-09-18 ENCOUNTER — HEALTH MAINTENANCE LETTER (OUTPATIENT)
Age: 26
End: 2021-09-18

## 2022-01-08 ENCOUNTER — HEALTH MAINTENANCE LETTER (OUTPATIENT)
Age: 27
End: 2022-01-08

## 2022-11-20 ENCOUNTER — HEALTH MAINTENANCE LETTER (OUTPATIENT)
Age: 27
End: 2022-11-20

## 2023-04-15 ENCOUNTER — HEALTH MAINTENANCE LETTER (OUTPATIENT)
Age: 28
End: 2023-04-15

## 2024-01-10 ENCOUNTER — LAB (OUTPATIENT)
Dept: LAB | Facility: CLINIC | Age: 29
End: 2024-01-10
Payer: COMMERCIAL

## 2024-01-10 DIAGNOSIS — N46.9 MALE INFERTILITY, UNSPECIFIED: ICD-10-CM

## 2024-01-10 LAB
ABNORMAL SPERM MORPHOLOGY: 96
ABSTINENCE DAYS: 4 DAYS (ref 2–7)
AGGLUTINATION: NO
ANALYSIS TEMP - CENTIGRADE: 21 CENTIGRADE
COLLECTION METHOD: NORMAL
COLLECTION SITE: NORMAL
CONSENT TO RELEASE TO PARTNER: YES
DAL- RECEIVED TIME: NORMAL
HEAD DEFECT: 96 %
IMMOTILE: 24 %
LIQUEFIED: YES
MIDPIECE DEFECT: 34 %
NON-PROGRESSIVE MOTILITY: 3 %
NORMAL SPERM MORPHOLOGY: 4 % NORMAL FORMS
PROGRESSIVE MOTILITY: 73 %
ROUND CELLS: 0.1 MILLION/ML
SPECIMEN PH: 7.2 PH
SPECIMEN VOLUME: 4 ML
SPERM CONCENTRATION: 20.3 MILLION/ML
TAIL DEFECT: 6 %
TIME OF ANALYSIS: NORMAL
TOTAL PROGRESSIVE MOTILE NUMBER: 59 MILLION
TOTAL SPERM NUMBER: 81 MILLION
VISCOUS: NO
VITALITY: NORMAL

## 2024-01-10 PROCEDURE — 89322 SEMEN ANAL STRICT CRITERIA: CPT

## 2024-06-16 ENCOUNTER — HEALTH MAINTENANCE LETTER (OUTPATIENT)
Age: 29
End: 2024-06-16

## 2025-06-21 ENCOUNTER — HEALTH MAINTENANCE LETTER (OUTPATIENT)
Age: 30
End: 2025-06-21